# Patient Record
Sex: MALE | Race: WHITE | NOT HISPANIC OR LATINO | URBAN - METROPOLITAN AREA
[De-identification: names, ages, dates, MRNs, and addresses within clinical notes are randomized per-mention and may not be internally consistent; named-entity substitution may affect disease eponyms.]

---

## 2022-08-08 ENCOUNTER — OFFICE (OUTPATIENT)
Dept: URBAN - METROPOLITAN AREA CLINIC 18 | Facility: CLINIC | Age: 77
End: 2022-08-08

## 2022-08-08 VITALS
DIASTOLIC BLOOD PRESSURE: 78 MMHG | HEIGHT: 71 IN | HEART RATE: 71 BPM | WEIGHT: 276 LBS | SYSTOLIC BLOOD PRESSURE: 126 MMHG

## 2022-08-08 DIAGNOSIS — R19.5 OTHER FECAL ABNORMALITIES: ICD-10-CM

## 2022-08-08 DIAGNOSIS — K62.5 HEMORRHAGE OF ANUS AND RECTUM: ICD-10-CM

## 2022-08-08 PROCEDURE — 99214 OFFICE O/P EST MOD 30 MIN: CPT | Performed by: NURSE PRACTITIONER

## 2022-08-08 NOTE — SERVICENOTES
Please start taking one heaping tablespoon of Metamucil once daily.  Dose may be increased to 2 times daily as needed.

## 2022-08-08 NOTE — SERVICEHPINOTES
Epifanio Gill   is seen today for a follow-up visit.     He is a ul  77-year-old male with a PMH including B-cell lymphoma and pulmonary embolism on chronic anticoagulation with Eliquis.  He also has a history of CRC diagnosed in his brother at the age of 70.  He is an established patient of Dr. Turpin who presents with his spouse, Fouzia, with the chief complaint of rectal bleeding.Patient is chronically ill-appearing. He admits to weakness requiring assistive devices for ambulation. He is using a walker today. He describes a 4 to 5-month history of rectal bleeding with defecation. This is bright red and parents. States it happens with nearly every bowel movement. He also described having intermittent loose stools.  States he will have normal BMs then will have a loose/explosive BM that is very soft and unformed about every 3 days. This is associated with fecal incontinence. He is adherent to taking Metamucil daily.Mr. Gill underwent a previous colonoscopy in 6/2021 indicated for rectal bleeding.  Exam was revealing for diverticulosis.  3 subcentimeter polyps were removed.  Pathology showed no adenomatous or hyperplastic changes.  br

## 2022-08-10 ENCOUNTER — AMBULATORY SURGICAL CENTER (OUTPATIENT)
Dept: URBAN - METROPOLITAN AREA SURGERY 7 | Facility: SURGERY | Age: 77
End: 2022-08-10
Payer: COMMERCIAL

## 2022-08-10 VITALS
DIASTOLIC BLOOD PRESSURE: 60 MMHG | SYSTOLIC BLOOD PRESSURE: 132 MMHG | DIASTOLIC BLOOD PRESSURE: 75 MMHG | DIASTOLIC BLOOD PRESSURE: 72 MMHG | OXYGEN SATURATION: 98 % | DIASTOLIC BLOOD PRESSURE: 59 MMHG | HEIGHT: 71 IN | OXYGEN SATURATION: 95 % | SYSTOLIC BLOOD PRESSURE: 162 MMHG | DIASTOLIC BLOOD PRESSURE: 85 MMHG | DIASTOLIC BLOOD PRESSURE: 60 MMHG | TEMPERATURE: 96.2 F | RESPIRATION RATE: 17 BRPM | DIASTOLIC BLOOD PRESSURE: 59 MMHG | RESPIRATION RATE: 16 BRPM | HEIGHT: 71 IN | RESPIRATION RATE: 34 BRPM | RESPIRATION RATE: 32 BRPM | HEART RATE: 110 BPM | HEART RATE: 120 BPM | DIASTOLIC BLOOD PRESSURE: 85 MMHG | TEMPERATURE: 96.2 F | RESPIRATION RATE: 32 BRPM | HEART RATE: 116 BPM | HEART RATE: 108 BPM | HEART RATE: 117 BPM | SYSTOLIC BLOOD PRESSURE: 115 MMHG | HEART RATE: 108 BPM | OXYGEN SATURATION: 97 % | SYSTOLIC BLOOD PRESSURE: 132 MMHG | RESPIRATION RATE: 36 BRPM | OXYGEN SATURATION: 95 % | RESPIRATION RATE: 36 BRPM | RESPIRATION RATE: 16 BRPM | HEART RATE: 116 BPM | DIASTOLIC BLOOD PRESSURE: 72 MMHG | SYSTOLIC BLOOD PRESSURE: 151 MMHG | DIASTOLIC BLOOD PRESSURE: 75 MMHG | SYSTOLIC BLOOD PRESSURE: 115 MMHG | RESPIRATION RATE: 18 BRPM | OXYGEN SATURATION: 98 % | HEART RATE: 110 BPM | WEIGHT: 229 LBS | HEART RATE: 107 BPM | HEART RATE: 117 BPM | RESPIRATION RATE: 17 BRPM | RESPIRATION RATE: 34 BRPM | SYSTOLIC BLOOD PRESSURE: 151 MMHG | SYSTOLIC BLOOD PRESSURE: 162 MMHG | OXYGEN SATURATION: 97 % | HEART RATE: 107 BPM | WEIGHT: 229 LBS | RESPIRATION RATE: 18 BRPM | HEART RATE: 120 BPM

## 2022-08-10 DIAGNOSIS — K57.30 DIVERTICULOSIS OF LARGE INTESTINE WITHOUT PERFORATION OR ABS: ICD-10-CM

## 2022-08-10 DIAGNOSIS — K62.5 HEMORRHAGE OF ANUS AND RECTUM: ICD-10-CM

## 2022-08-10 DIAGNOSIS — K64.0 FIRST DEGREE HEMORRHOIDS: ICD-10-CM

## 2022-08-10 PROCEDURE — 45330 DIAGNOSTIC SIGMOIDOSCOPY: CPT | Performed by: INTERNAL MEDICINE

## 2022-08-10 NOTE — SERVICENOTES
Patient is considering whether he wishes to be referred for definitive treatment of his hemorrhoids.  We will not make a referral today.

## 2022-08-10 NOTE — SERVICEHPINOTES
Patient has had frequent rectal bleeding with blood seen on the paper and also in the bowl, and what appears to be a substantial amount to the patient. He also has seen blood dripping from the anus after bowel movement. He had a colonoscopy a year ago which showed several small polyps and diverticulosis. He was not bleeding significantly at that time. Sigmoidoscopy scheduled to assess etiology of his bleeding.

## 2025-01-07 NOTE — SERVICEHPINOTES
Chest tightness and dizziness, pt tested positive for flu at home.   Patient has had frequent rectal bleeding with blood seen on the paper and also in the bowl, and what appears to be a substantial amount to the patient. He also has seen blood dripping from the anus after bowel movement. He had a colonoscopy a year ago which showed several small polyps and diverticulosis. He was not bleeding significantly at that time. Sigmoidoscopy scheduled to assess etiology of his bleeding.